# Patient Record
Sex: FEMALE | URBAN - NONMETROPOLITAN AREA
[De-identification: names, ages, dates, MRNs, and addresses within clinical notes are randomized per-mention and may not be internally consistent; named-entity substitution may affect disease eponyms.]

---

## 2022-03-14 ENCOUNTER — DOCTOR'S OFFICE (OUTPATIENT)
Dept: URBAN - NONMETROPOLITAN AREA CLINIC 1 | Facility: CLINIC | Age: 32
Setting detail: OPHTHALMOLOGY
End: 2022-03-14
Payer: MEDICARE

## 2022-03-14 DIAGNOSIS — H35.52: ICD-10-CM

## 2022-03-14 DIAGNOSIS — H25.13: ICD-10-CM

## 2022-03-14 PROCEDURE — 99204 OFFICE O/P NEW MOD 45 MIN: CPT | Performed by: OPHTHALMOLOGY

## 2022-03-14 PROCEDURE — 92134 CPTRZ OPH DX IMG PST SGM RTA: CPT | Performed by: OPHTHALMOLOGY

## 2022-03-14 ASSESSMENT — CONFRONTATIONAL VISUAL FIELD TEST (CVF)
OD_FINDINGS: FULL
OS_FINDINGS: FULL

## 2022-03-22 ASSESSMENT — REFRACTION_CURRENTRX
OD_AXIS: 168
OS_AXIS: 004
OD_CYLINDER: -1.75
OD_SPHERE: +1.00
OD_OVR_VA: 20/
OS_CYLINDER: -1.00
OS_SPHERE: +1.25
OS_OVR_VA: 20/

## 2022-03-22 ASSESSMENT — REFRACTION_AUTOREFRACTION
OS_CYLINDER: -0.75
OD_AXIS: 164
OS_SPHERE: +0.75
OD_SPHERE: +1.00
OD_CYLINDER: -1.50
OS_AXIS: 040

## 2022-03-22 ASSESSMENT — VISUAL ACUITY
OS_BCVA: 20/50-2
OD_BCVA: 20/80

## 2022-03-22 ASSESSMENT — SPHEQUIV_DERIVED
OD_SPHEQUIV: 0.25
OS_SPHEQUIV: 0.375

## 2022-04-05 ENCOUNTER — DOCTOR'S OFFICE (OUTPATIENT)
Dept: URBAN - NONMETROPOLITAN AREA CLINIC 1 | Facility: CLINIC | Age: 32
Setting detail: OPHTHALMOLOGY
End: 2022-04-05
Payer: MEDICARE

## 2022-04-05 ENCOUNTER — OPTICAL OFFICE (OUTPATIENT)
Dept: URBAN - NONMETROPOLITAN AREA CLINIC 4 | Facility: CLINIC | Age: 32
Setting detail: OPHTHALMOLOGY
End: 2022-04-05
Payer: COMMERCIAL

## 2022-04-05 ENCOUNTER — DOCTOR'S OFFICE (OUTPATIENT)
Dept: URBAN - NONMETROPOLITAN AREA CLINIC 1 | Facility: CLINIC | Age: 32
Setting detail: OPHTHALMOLOGY
End: 2022-04-05
Payer: COMMERCIAL

## 2022-04-05 DIAGNOSIS — H52.223: ICD-10-CM

## 2022-04-05 DIAGNOSIS — H52.03: ICD-10-CM

## 2022-04-05 DIAGNOSIS — Z02.4: ICD-10-CM

## 2022-04-05 PROCEDURE — 92012 INTRM OPH EXAM EST PATIENT: CPT | Performed by: OPTOMETRIST

## 2022-04-05 PROCEDURE — 92015 DETERMINE REFRACTIVE STATE: CPT | Performed by: OPTOMETRIST

## 2022-04-05 PROCEDURE — V2744 TINT PHOTOCHROMATIC LENS/ES: HCPCS | Performed by: OPTOMETRIST

## 2022-04-05 PROCEDURE — V2750 ANTI-REFLECTIVE COATING: HCPCS | Performed by: OPTOMETRIST

## 2022-04-05 PROCEDURE — V2020 VISION SVCS FRAMES PURCHASES: HCPCS | Performed by: OPTOMETRIST

## 2022-04-05 PROCEDURE — 67911 REVISE EYELID DEFECT: CPT | Performed by: OPTOMETRIST

## 2022-04-05 PROCEDURE — V2103 SPHEROCYLINDR 4.00D/12-2.00D: HCPCS | Performed by: OPTOMETRIST

## 2022-04-05 PROCEDURE — 92083 EXTENDED VISUAL FIELD XM: CPT | Performed by: OPTOMETRIST

## 2022-04-05 ASSESSMENT — REFRACTION_MANIFEST
OD_AXIS: 165
OD_SPHERE: +1.00
OS_CYLINDER: -1.00
OD_CYLINDER: -1.50
OS_SPHERE: +0.75
OD_VA1: 20/30+2
OS_AXIS: 040
OS_VA1: 20/100+1

## 2022-04-05 ASSESSMENT — REFRACTION_CURRENTRX
OD_SPHERE: +1.00
OD_OVR_VA: 20/
OS_AXIS: 005
OS_CYLINDER: -1.25
OS_OVR_VA: 20/
OS_SPHERE: +1.25
OD_CYLINDER: -1.75
OD_AXIS: 172

## 2022-04-05 ASSESSMENT — SPHEQUIV_DERIVED
OS_SPHEQUIV: 0.25
OD_SPHEQUIV: 0.25
OS_SPHEQUIV: 0.375
OD_SPHEQUIV: 0.25

## 2022-04-05 ASSESSMENT — VISUAL ACUITY
OD_BCVA: 20/100
OS_BCVA: 20/30+2

## 2022-04-05 ASSESSMENT — REFRACTION_AUTOREFRACTION
OS_SPHERE: +0.75
OS_CYLINDER: -0.75
OD_CYLINDER: -1.50
OS_AXIS: 040
OD_SPHERE: +1.00
OD_AXIS: 164

## 2022-04-05 ASSESSMENT — CONFRONTATIONAL VISUAL FIELD TEST (CVF)
OS_FINDINGS: FULL
OD_FINDINGS: FULL

## 2022-04-18 ENCOUNTER — DOCTOR'S OFFICE (OUTPATIENT)
Dept: URBAN - NONMETROPOLITAN AREA CLINIC 1 | Facility: CLINIC | Age: 32
Setting detail: OPHTHALMOLOGY
End: 2022-04-18

## 2022-04-18 DIAGNOSIS — H52.223: ICD-10-CM

## 2022-04-18 PROBLEM — H25.13 CATARACT NUCLEAR SCLEROSIS; BOTH EYES: Status: ACTIVE | Noted: 2022-03-14

## 2022-04-18 PROBLEM — H35.52 RETINITIS PIGMENTOSA ; BOTH EYES: Status: ACTIVE | Noted: 2022-03-14

## 2022-04-18 PROBLEM — Z02.4 ENCOUNTER FOR EXAMINATION FOR DRIVING LICENSE ; BOTH EYES: Status: ACTIVE | Noted: 2022-04-05

## 2022-04-18 PROCEDURE — CLFUP CONTACT LENS FOLLOW-UP: Performed by: OPTOMETRIST

## 2022-11-09 ENCOUNTER — DOCTOR'S OFFICE (OUTPATIENT)
Dept: URBAN - NONMETROPOLITAN AREA CLINIC 1 | Facility: CLINIC | Age: 32
Setting detail: OPHTHALMOLOGY
End: 2022-11-09
Payer: OTHER MISCELLANEOUS

## 2022-11-09 DIAGNOSIS — Z02.71: ICD-10-CM

## 2022-11-09 PROCEDURE — MEDICAL RE MEDICAL RECORDS: Performed by: OPHTHALMOLOGY

## 2023-04-11 ENCOUNTER — DOCTOR'S OFFICE (OUTPATIENT)
Dept: URBAN - NONMETROPOLITAN AREA CLINIC 1 | Facility: CLINIC | Age: 33
Setting detail: OPHTHALMOLOGY
End: 2023-04-11
Payer: MEDICARE

## 2023-04-11 ENCOUNTER — OPTICAL OFFICE (OUTPATIENT)
Dept: URBAN - NONMETROPOLITAN AREA CLINIC 4 | Facility: CLINIC | Age: 33
Setting detail: OPHTHALMOLOGY
End: 2023-04-11
Payer: COMMERCIAL

## 2023-04-11 DIAGNOSIS — H52.03: ICD-10-CM

## 2023-04-11 DIAGNOSIS — H52.223: ICD-10-CM

## 2023-04-11 DIAGNOSIS — H35.52: ICD-10-CM

## 2023-04-11 PROCEDURE — 92015 DETERMINE REFRACTIVE STATE: CPT | Performed by: OPTOMETRIST

## 2023-04-11 PROCEDURE — 92250 FUNDUS PHOTOGRAPHY W/I&R: CPT | Performed by: OPTOMETRIST

## 2023-04-11 PROCEDURE — 92014 COMPRE OPH EXAM EST PT 1/>: CPT | Performed by: OPTOMETRIST

## 2023-04-11 PROCEDURE — 92083 EXTENDED VISUAL FIELD XM: CPT | Performed by: OPTOMETRIST

## 2023-04-11 PROCEDURE — S0500 DISPOS CONT LENS: HCPCS | Performed by: OPTOMETRIST

## 2023-04-11 ASSESSMENT — TONOMETRY
OD_IOP_MMHG: 15
OS_IOP_MMHG: 15

## 2023-04-11 ASSESSMENT — CONFRONTATIONAL VISUAL FIELD TEST (CVF)
OS_FINDINGS: FULL
OD_FINDINGS: FULL

## 2023-04-12 ASSESSMENT — REFRACTION_CURRENTRX
OD_OVR_VA: 20/
OD_AXIS: 172
OD_CYLINDER: -1.75
OS_CYLINDER: -1.25
OS_OVR_VA: 20/
OD_SPHERE: +1.00
OS_SPHERE: +1.25
OS_AXIS: 005

## 2023-04-12 ASSESSMENT — SPHEQUIV_DERIVED
OD_SPHEQUIV: 1
OS_SPHEQUIV: 1.125
OS_SPHEQUIV: 1.375
OD_SPHEQUIV: 0.625

## 2023-04-12 ASSESSMENT — VISUAL ACUITY
OS_BCVA: 20/25-1
OD_BCVA: 20/80-1

## 2023-04-12 ASSESSMENT — REFRACTION_MANIFEST
OD_CYLINDER: -1.75
OS_CYLINDER: -0.75
OS_VA1: 20/100+1
OS_SPHERE: +1.50
OD_SPHERE: +1.50
OS_VA2: 20/100+1
OD_AXIS: 165
OD_VA1: 20/30+2
OS_AXIS: 065
OD_VA2: 20/30+2

## 2023-04-12 ASSESSMENT — REFRACTION_AUTOREFRACTION
OD_SPHERE: +2.00
OS_AXIS: 065
OS_CYLINDER: -0.75
OD_AXIS: 171
OS_SPHERE: +1.75
OD_CYLINDER: -2.00

## 2023-07-13 ENCOUNTER — DOCTOR'S OFFICE (OUTPATIENT)
Dept: URBAN - NONMETROPOLITAN AREA CLINIC 1 | Facility: CLINIC | Age: 33
Setting detail: OPHTHALMOLOGY
End: 2023-07-13
Payer: MEDICARE

## 2023-07-13 DIAGNOSIS — H35.52: ICD-10-CM

## 2023-07-13 DIAGNOSIS — H25.13: ICD-10-CM

## 2023-07-13 PROCEDURE — 92134 CPTRZ OPH DX IMG PST SGM RTA: CPT | Performed by: OPHTHALMOLOGY

## 2023-07-13 PROCEDURE — 99213 OFFICE O/P EST LOW 20 MIN: CPT | Performed by: OPHTHALMOLOGY

## 2023-07-13 ASSESSMENT — REFRACTION_CURRENTRX
OS_CYLINDER: -1.25
OS_AXIS: 005
OS_OVR_VA: 20/
OD_AXIS: 172
OD_OVR_VA: 20/
OS_SPHERE: +1.25
OD_SPHERE: +1.00
OD_CYLINDER: -1.75

## 2023-07-13 ASSESSMENT — VISUAL ACUITY
OS_BCVA: 20/25-2
OD_BCVA: 20/80-1

## 2023-07-13 ASSESSMENT — REFRACTION_MANIFEST
OD_VA1: 20/30+2
OS_VA1: 20/100+1
OD_SPHERE: +1.50
OS_VA2: 20/100+1
OS_SPHERE: +1.50
OS_AXIS: 065
OS_CYLINDER: -0.75
OD_VA2: 20/30+2
OD_CYLINDER: -1.75
OD_AXIS: 165

## 2023-07-13 ASSESSMENT — CONFRONTATIONAL VISUAL FIELD TEST (CVF)
OD_FINDINGS: FULL
OS_FINDINGS: FULL

## 2023-07-13 ASSESSMENT — REFRACTION_AUTOREFRACTION
OS_CYLINDER: -0.75
OD_SPHERE: +2.00
OD_AXIS: 171
OD_CYLINDER: -2.00
OS_AXIS: 065
OS_SPHERE: +1.75

## 2023-07-13 ASSESSMENT — SPHEQUIV_DERIVED
OS_SPHEQUIV: 1.375
OD_SPHEQUIV: 0.625
OS_SPHEQUIV: 1.125
OD_SPHEQUIV: 1

## 2023-07-27 ENCOUNTER — DOCTOR'S OFFICE (OUTPATIENT)
Dept: URBAN - NONMETROPOLITAN AREA CLINIC 1 | Facility: CLINIC | Age: 33
Setting detail: OPHTHALMOLOGY
End: 2023-07-27
Payer: MEDICARE

## 2023-07-27 DIAGNOSIS — H35.342: ICD-10-CM

## 2023-07-27 DIAGNOSIS — H25.13: ICD-10-CM

## 2023-07-27 DIAGNOSIS — H35.52: ICD-10-CM

## 2023-07-27 PROCEDURE — 92235 FLUORESCEIN ANGRPH MLTIFRAME: CPT | Performed by: OPHTHALMOLOGY

## 2023-07-27 PROCEDURE — 99213 OFFICE O/P EST LOW 20 MIN: CPT | Performed by: OPHTHALMOLOGY

## 2023-07-27 PROCEDURE — 92250 FUNDUS PHOTOGRAPHY W/I&R: CPT | Performed by: OPHTHALMOLOGY

## 2023-07-27 ASSESSMENT — REFRACTION_CURRENTRX
OS_OVR_VA: 20/
OD_OVR_VA: 20/
OS_AXIS: 005
OD_SPHERE: +1.00
OS_CYLINDER: -1.25
OS_SPHERE: +1.25
OD_CYLINDER: -1.75
OD_AXIS: 172

## 2023-07-27 ASSESSMENT — REFRACTION_AUTOREFRACTION
OD_CYLINDER: -2.00
OD_SPHERE: +2.00
OS_SPHERE: +1.75
OD_AXIS: 171
OS_AXIS: 065
OS_CYLINDER: -0.75

## 2023-07-27 ASSESSMENT — REFRACTION_MANIFEST
OS_CYLINDER: -0.75
OD_VA1: 20/30+2
OS_SPHERE: +1.50
OD_SPHERE: +1.50
OD_CYLINDER: -1.75
OS_VA1: 20/100+1
OS_AXIS: 065
OD_AXIS: 165
OS_VA2: 20/100+1
OD_VA2: 20/30+2

## 2023-07-27 ASSESSMENT — SPHEQUIV_DERIVED
OS_SPHEQUIV: 1.125
OD_SPHEQUIV: 1
OS_SPHEQUIV: 1.375
OD_SPHEQUIV: 0.625

## 2023-07-27 ASSESSMENT — CONFRONTATIONAL VISUAL FIELD TEST (CVF)
OS_FINDINGS: FULL
OD_FINDINGS: FULL

## 2023-07-27 ASSESSMENT — VISUAL ACUITY
OD_BCVA: 20/80-1
OS_BCVA: 20/25

## 2024-02-05 ENCOUNTER — RX ONLY (RX ONLY)
Age: 34
End: 2024-02-05

## 2024-02-05 ENCOUNTER — DOCTOR'S OFFICE (OUTPATIENT)
Dept: URBAN - NONMETROPOLITAN AREA CLINIC 1 | Facility: CLINIC | Age: 34
Setting detail: OPHTHALMOLOGY
End: 2024-02-05
Payer: COMMERCIAL

## 2024-02-05 DIAGNOSIS — H52.223: ICD-10-CM

## 2024-02-05 DIAGNOSIS — H52.03: ICD-10-CM

## 2024-02-05 PROCEDURE — 92012 INTRM OPH EXAM EST PATIENT: CPT | Performed by: OPTOMETRIST

## 2024-02-05 ASSESSMENT — CONFRONTATIONAL VISUAL FIELD TEST (CVF)
OS_FINDINGS: FULL
OD_FINDINGS: FULL

## 2024-02-12 ENCOUNTER — DOCTOR'S OFFICE (OUTPATIENT)
Dept: URBAN - NONMETROPOLITAN AREA CLINIC 1 | Facility: CLINIC | Age: 34
Setting detail: OPHTHALMOLOGY
End: 2024-02-12
Payer: MEDICARE

## 2024-02-12 DIAGNOSIS — Z02.4: ICD-10-CM

## 2024-02-12 PROCEDURE — 92082 INTERMEDIATE VISUAL FIELD XM: CPT | Performed by: OPHTHALMOLOGY

## 2024-02-14 ENCOUNTER — HOSPITAL ENCOUNTER (EMERGENCY)
Facility: HOSPITAL | Age: 34
Discharge: HOME | End: 2024-02-14
Attending: EMERGENCY MEDICINE
Payer: MEDICARE

## 2024-02-14 VITALS
DIASTOLIC BLOOD PRESSURE: 90 MMHG | BODY MASS INDEX: 30.9 KG/M2 | HEIGHT: 64 IN | RESPIRATION RATE: 18 BRPM | OXYGEN SATURATION: 99 % | SYSTOLIC BLOOD PRESSURE: 140 MMHG | WEIGHT: 181 LBS | TEMPERATURE: 97.7 F | HEART RATE: 80 BPM

## 2024-02-14 DIAGNOSIS — J02.0 STREP PHARYNGITIS: ICD-10-CM

## 2024-02-14 DIAGNOSIS — G43.909 MIGRAINE: Primary | ICD-10-CM

## 2024-02-14 LAB
FLUAV RNA RESP QL NAA+PROBE: NEGATIVE
FLUBV RNA RESP QL NAA+PROBE: NEGATIVE
RSV RNA RESP QL NAA+PROBE: NEGATIVE
S PYO DNA THROAT QL NAA+PROBE: DETECTED
SARS-COV-2 RNA RESP QL NAA+PROBE: NEGATIVE

## 2024-02-14 PROCEDURE — 87651 STREP A DNA AMP PROBE: CPT | Performed by: EMERGENCY MEDICINE

## 2024-02-14 PROCEDURE — 96375 TX/PRO/DX INJ NEW DRUG ADDON: CPT

## 2024-02-14 PROCEDURE — 96361 HYDRATE IV INFUSION ADD-ON: CPT

## 2024-02-14 PROCEDURE — 0241U HB NFCT DS VIR RESP RNA 4 TRGT: CPT | Performed by: EMERGENCY MEDICINE

## 2024-02-14 PROCEDURE — 96374 THER/PROPH/DIAG INJ IV PUSH: CPT

## 2024-02-14 PROCEDURE — 99284 EMERGENCY DEPT VISIT MOD MDM: CPT | Performed by: EMERGENCY MEDICINE

## 2024-02-14 PROCEDURE — 99283 EMERGENCY DEPT VISIT LOW MDM: CPT

## 2024-02-14 RX ORDER — KETOROLAC TROMETHAMINE 30 MG/ML
30 INJECTION, SOLUTION INTRAMUSCULAR; INTRAVENOUS ONCE
Status: COMPLETED | OUTPATIENT
Start: 2024-02-14 | End: 2024-02-14

## 2024-02-14 RX ORDER — DIPHENHYDRAMINE HYDROCHLORIDE 50 MG/ML
25 INJECTION INTRAMUSCULAR; INTRAVENOUS ONCE
Status: COMPLETED | OUTPATIENT
Start: 2024-02-14 | End: 2024-02-14

## 2024-02-14 RX ORDER — CEFADROXIL 500 MG/1
500 CAPSULE ORAL EVERY 12 HOURS SCHEDULED
Qty: 20 CAPSULE | Refills: 0 | Status: SHIPPED | OUTPATIENT
Start: 2024-02-14 | End: 2024-02-24

## 2024-02-14 RX ORDER — ONDANSETRON 4 MG/1
4 TABLET, FILM COATED ORAL EVERY 6 HOURS PRN
Qty: 10 TABLET | Refills: 0 | Status: SHIPPED | OUTPATIENT
Start: 2024-02-14

## 2024-02-14 RX ORDER — DROPERIDOL 2.5 MG/ML
2.5 INJECTION, SOLUTION INTRAMUSCULAR; INTRAVENOUS ONCE
Status: COMPLETED | OUTPATIENT
Start: 2024-02-14 | End: 2024-02-14

## 2024-02-14 RX ADMIN — KETOROLAC TROMETHAMINE 30 MG: 30 INJECTION, SOLUTION INTRAMUSCULAR; INTRAVENOUS at 19:46

## 2024-02-14 RX ADMIN — DROPERIDOL 2.5 MG: 2.5 INJECTION, SOLUTION INTRAMUSCULAR; INTRAVENOUS at 19:47

## 2024-02-14 RX ADMIN — SODIUM CHLORIDE 1000 ML: 0.9 INJECTION, SOLUTION INTRAVENOUS at 19:44

## 2024-02-14 RX ADMIN — DIPHENHYDRAMINE HYDROCHLORIDE 25 MG: 50 INJECTION, SOLUTION INTRAMUSCULAR; INTRAVENOUS at 19:48

## 2024-02-14 NOTE — Clinical Note
Stephen Bowling was seen and treated in our emergency department on 2/14/2024.                Diagnosis:     Stephen  may return to work on return date.    She may return on this date: 02/17/2024         If you have any questions or concerns, please don't hesitate to call.      Christian Crowe, DO    ______________________________           _______________          _______________  Hospital Representative                              Date                                Time

## 2024-02-15 NOTE — ED PROVIDER NOTES
History  Chief Complaint   Patient presents with    Headache     Pt reports since Sunday she's had a migraine. PMHx of migraines, does not feel the same feels worse. Prescribed medication not providing relief. Pt also c/o congestion, throat and ear pain.     33-year-old female accompanied by spouse describes migrainous pounding bilateral frontal headache over the past few days with sore throat, nasal congestion, headache nonamenable to usual triptan type oral medications      History provided by:  Patient  Headache  Pain location:  Frontal  Onset quality:  Gradual  Timing:  Constant  Progression:  Waxing and waning  Chronicity:  Recurrent  Similar to prior headaches: yes    Relieved by:  Prescription medications  Associated symptoms: URI    Associated symptoms: no abdominal pain, no back pain and no fever        Prior to Admission Medications   Prescriptions Last Dose Informant Patient Reported? Taking?   HYDROcodone-acetaminophen (NORCO) 5-325 mg per tablet   No No   Sig: Take 1 tablet by mouth every 6 (six) hours as needed for pain Max Daily Amount: 4 tablets   amoxicillin (AMOXIL) 500 mg capsule   No No   Sig: Take 1 capsule by mouth 3 (three) times a day   neomycin-polymyxin-hydrocortisone (CORTISPORIN) 0.35%-10,000 units/mL-1% otic suspension   No No   Sig: Administer 4 drops into ears 3 (three) times a day   sertraline (ZOLOFT) 50 mg tablet  Self Yes No   Sig: Take 50 mg by mouth daily.      Facility-Administered Medications: None       Past Medical History:   Diagnosis Date    Asthma     Migraine     Psychiatric disorder     anxiety and depression    Retinal pigmentation        History reviewed. No pertinent surgical history.    Family History   Problem Relation Age of Onset    Hypertension Mother      I have reviewed and agree with the history as documented.    E-Cigarette/Vaping    E-Cigarette Use Never User      E-Cigarette/Vaping Substances     Social History     Tobacco Use    Smoking status: Every Day      Current packs/day: 0.20     Types: Cigarettes   Vaping Use    Vaping status: Never Used   Substance Use Topics    Alcohol use: No    Drug use: No       Review of Systems   Constitutional:  Negative for fever.   Gastrointestinal:  Negative for abdominal pain.   Musculoskeletal:  Negative for back pain.   Neurological:  Positive for headaches.   All other systems reviewed and are negative.      Physical Exam  Physical Exam  Vitals and nursing note reviewed.   Constitutional:       General: She is not in acute distress.     Comments: Pleasant, comfortable-appearing, conversational, articulate   HENT:      Head: Normocephalic and atraumatic.      Right Ear: Tympanic membrane normal.      Left Ear: Tympanic membrane normal.      Mouth/Throat:      Mouth: Mucous membranes are moist.      Pharynx: Oropharynx is clear. Posterior oropharyngeal erythema present.      Comments: Large tonsils bilaterally, not kissing or asymmetric, no oropharyngeal swelling or sublingual swelling or submandibular asymmetry  Eyes:      General:         Right eye: No discharge.         Left eye: No discharge.      Conjunctiva/sclera: Conjunctivae normal.      Pupils: Pupils are equal, round, and reactive to light.   Cardiovascular:      Rate and Rhythm: Normal rate and regular rhythm.      Heart sounds: Normal heart sounds.   Pulmonary:      Effort: Pulmonary effort is normal.      Breath sounds: Normal breath sounds.   Abdominal:      General: Bowel sounds are normal. There is no distension.      Palpations: Abdomen is soft.      Tenderness: There is no abdominal tenderness.   Musculoskeletal:         General: No deformity.      Cervical back: Neck supple. Tenderness present. No rigidity.   Lymphadenopathy:      Cervical: Cervical adenopathy present.   Skin:     General: Skin is warm and dry.      Findings: No rash.   Neurological:      General: No focal deficit present.      Mental Status: She is alert and oriented to person, place, and  time.      Cranial Nerves: No cranial nerve deficit.      Coordination: Coordination normal.   Psychiatric:         Behavior: Behavior normal.         Thought Content: Thought content normal.         Judgment: Judgment normal.         Vital Signs  ED Triage Vitals [02/14/24 1916]   Temperature Pulse Respirations Blood Pressure SpO2   97.7 °F (36.5 °C) 80 18 143/92 99 %      Temp Source Heart Rate Source Patient Position - Orthostatic VS BP Location FiO2 (%)   Oral Monitor Sitting Left arm --      Pain Score       10 - Worst Possible Pain           Vitals:    02/14/24 1916 02/14/24 2130   BP: 143/92 140/90   Pulse: 80    Patient Position - Orthostatic VS: Sitting Lying         Visual Acuity      ED Medications  Medications   sodium chloride 0.9 % bolus 1,000 mL (0 mL Intravenous Stopped 2/14/24 2044)   ketorolac (TORADOL) injection 30 mg (30 mg Intravenous Given 2/14/24 1946)   diphenhydrAMINE (BENADRYL) injection 25 mg (25 mg Intravenous Given 2/14/24 1948)   droperidol (INAPSINE) injection 2.5 mg (2.5 mg Intravenous Given 2/14/24 1947)       Diagnostic Studies  Results Reviewed       Procedure Component Value Units Date/Time    FLU/RSV/COVID - if FLU/RSV clinically relevant [[id number]]  (Normal) Collected: 02/14/24 1941    Lab Status: Final result Specimen: Nares from Nose Updated: 02/14/24 2034     SARS-CoV-2 Negative     INFLUENZA A PCR Negative     INFLUENZA B PCR Negative     RSV PCR Negative    Narrative:      FOR PEDIATRIC PATIENTS - copy/paste COVID Guidelines URL to browser: [email or web address]    SARS-CoV-2 assay is a Nucleic Acid Amplification assay intended for the  qualitative detection of nucleic acid from SARS-CoV-2 in nasopharyngeal  swabs. Results are for the presumptive identification of SARS-CoV-2 RNA.    Positive results are indicative of infection with SARS-CoV-2, the virus  causing COVID-19, but do not rule out bacterial infection or  co-infection  with other viruses. Laboratories within the [place] and its  territories are required to report all positive results to the appropriate  public health authorities. Negative results do not preclude SARS-CoV-2  infection and should not be used as the sole basis for treatment or other  patient management decisions. Negative results must be combined with  clinical observations, patient history, and epidemiological information.  This test has not been FDA cleared or approved.    This test has been authorized by FDA under an Emergency Use Authorization  (EUA). This test is only authorized for the duration of time the  declaration that circumstances exist justifying the authorization of the  emergency use of an in vitro diagnostic tests for detection of SARS-CoV-2  virus and/or diagnosis of COVID-19 infection under section 564(b)(1) of  the Act, 21 U.S.C. 360bbb-3(b)(1), unless the authorization is terminated  or revoked sooner. The test has been validated but independent review by FDA  and CLIA is pending.    Test performed using [employer] Gene[employer]pert: This RT-PCR assay targets N2,  a region unique to SARS-CoV-2. A conserved region in the E-gene was chosen  for pan-Sarbecovirus detection which includes SARS-CoV-2.    According to CMS-2020-01-R, this platform meets the definition of high-throughput technology.    Strep A PCR [[id number]]  (Abnormal) Collected: 02/14/24 1941    Lab Status: Final result Specimen: Throat Updated: 02/14/24 2020     STREP A PCR Detected                   No orders to display              Procedures  Procedures         ED Course  ED Course as of 02/14/24 2252   Wed Feb 14, 2024 2124 STREP A PCR(!): Detected   2126 Headache resolved, feels much better. Discussed results and agreeable with close outpatient f/u, and will return if worse or new symptoms                               SBIRT 22yo+      Flowsheet Row Most Recent Value   Initial Alcohol Screen: US AUDIT-C     1. How  often do you have a drink containing alcohol? 0 Filed at: 02/14/2024 1920   2. How many drinks containing alcohol do you have on a typical day you are drinking?  0 Filed at: 02/14/2024 1920   3b. FEMALE Any Age, or MALE 65+: How often do you have 4 or more drinks on one occassion? 0 Filed at: 02/14/2024 1920   Audit-C Score 0 Filed at: 02/14/2024 1920   KENDAL: How many times in the past year have you...    Used an illegal drug or used a prescription medication for non-medical reasons? Never Filed at: 02/14/2024 1920                      Medical Decision Making  Amount and/or Complexity of Data Reviewed  Labs: ordered. Decision-making details documented in ED Course.  ECG/medicine tests: ordered and independent interpretation performed. Decision-making details documented in ED Course.    Risk  Prescription drug management.             Disposition  Final diagnoses:   Migraine   Strep pharyngitis     Time reflects when diagnosis was documented in both MDM as applicable and the Disposition within this note       Time User Action Codes Description Comment    2/14/2024  9:25 PM Christian Crowe Add [G43.909] Migraine     2/14/2024  9:25 PM Christian Crowe Add [J02.0] Strep pharyngitis           ED Disposition       ED Disposition   Discharge    Condition   Stable    Date/Time   Wed Feb 14, 2024 2125    Comment   Stephen Bowling discharge to home/self care.                   Follow-up Information       Follow up With Specialties Details Why Contact Info    Pan Fontaine MD Family Medicine   [address] [place] [address]  [phone]              Discharge Medication List as of 2/14/2024  9:26 PM        START taking these medications    Details   cefadroxil (DURICEF) 500 mg capsule Take 1 capsule (500 mg total) by mouth every 12 (twelve) hours for 10 days, Starting Wed 2/14/2024, Until Sat 2/24/2024, Normal      ondansetron (ZOFRAN) 4 mg tablet Take 1 tablet (4 mg total) by mouth every 6 (six) hours as  needed for nausea or vomiting, Starting Wed 2/14/2024, Normal           CONTINUE these medications which have NOT CHANGED    Details   amoxicillin (AMOXIL) 500 mg capsule Take 1 capsule by mouth 3 (three) times a day, Starting 10/25/2016, Until Discontinued, Print      HYDROcodone-acetaminophen (NORCO) 5-325 mg per tablet Take 1 tablet by mouth every 6 (six) hours as needed for pain Max Daily Amount: 4 tablets, Starting 10/25/2016, Until Discontinued, Print      neomycin-polymyxin-hydrocortisone (CORTISPORIN) 0.35%-10,000 units/mL-1% otic suspension Administer 4 drops into ears 3 (three) times a day, Starting 10/25/2016, Until Discontinued, Print      sertraline (ZOLOFT) 50 mg tablet Take 50 mg by mouth daily., Until Discontinued, Historical Med             No discharge procedures on file.    PDMP Review       None            ED Provider  Electronically Signed by             Christian Crowe DO  02/14/24 6206

## 2024-02-15 NOTE — DISCHARGE INSTRUCTIONS
Return immediately if worse or any new symptoms  Tylenol 1000 mg every 6 hours as needed  and/or  Advil 400 mg every 6 hours as needed  May take both together

## 2024-08-20 ENCOUNTER — DOCTOR'S OFFICE (OUTPATIENT)
Dept: URBAN - NONMETROPOLITAN AREA CLINIC 1 | Facility: CLINIC | Age: 34
Setting detail: OPHTHALMOLOGY
End: 2024-08-20
Payer: COMMERCIAL

## 2024-08-20 DIAGNOSIS — H52.223: ICD-10-CM

## 2024-08-20 DIAGNOSIS — Z03.89: ICD-10-CM

## 2024-08-20 DIAGNOSIS — Z02.4: ICD-10-CM

## 2024-08-20 PROCEDURE — 92014 COMPRE OPH EXAM EST PT 1/>: CPT | Performed by: OPTOMETRIST

## 2024-08-20 PROCEDURE — 92083 EXTENDED VISUAL FIELD XM: CPT | Performed by: OPTOMETRIST

## 2024-08-20 ASSESSMENT — CONFRONTATIONAL VISUAL FIELD TEST (CVF)
OS_FINDINGS: FULL
OD_FINDINGS: FULL

## 2024-08-22 ENCOUNTER — DOCTOR'S OFFICE (OUTPATIENT)
Dept: URBAN - NONMETROPOLITAN AREA CLINIC 1 | Facility: CLINIC | Age: 34
Setting detail: OPHTHALMOLOGY
End: 2024-08-22
Payer: MEDICARE

## 2024-08-22 DIAGNOSIS — H35.342: ICD-10-CM

## 2024-08-22 DIAGNOSIS — H35.52: ICD-10-CM

## 2024-08-22 DIAGNOSIS — H25.13: ICD-10-CM

## 2024-08-22 PROCEDURE — 92014 COMPRE OPH EXAM EST PT 1/>: CPT | Performed by: OPHTHALMOLOGY

## 2024-08-22 PROCEDURE — 92134 CPTRZ OPH DX IMG PST SGM RTA: CPT | Performed by: OPHTHALMOLOGY

## 2024-08-22 ASSESSMENT — CONFRONTATIONAL VISUAL FIELD TEST (CVF)
OS_FINDINGS: FULL
OD_FINDINGS: FULL

## 2024-09-16 ENCOUNTER — DOCTOR'S OFFICE (OUTPATIENT)
Dept: URBAN - NONMETROPOLITAN AREA CLINIC 1 | Facility: CLINIC | Age: 34
Setting detail: OPHTHALMOLOGY
End: 2024-09-16
Payer: MEDICARE

## 2024-09-16 DIAGNOSIS — H35.52: ICD-10-CM

## 2024-09-16 DIAGNOSIS — H35.342: ICD-10-CM

## 2024-09-16 PROCEDURE — 92250 FUNDUS PHOTOGRAPHY W/I&R: CPT | Performed by: OPHTHALMOLOGY

## 2024-09-16 PROCEDURE — 92235 FLUORESCEIN ANGRPH MLTIFRAME: CPT | Performed by: OPHTHALMOLOGY

## 2024-09-16 PROCEDURE — 99214 OFFICE O/P EST MOD 30 MIN: CPT | Performed by: OPHTHALMOLOGY

## 2024-09-16 ASSESSMENT — REFRACTION_MANIFEST
OD_SPHERE: +1.25
OS_CYLINDER: -1.00
OS_VA2: 20/80-2
OD_VA1: 20/25+2
OS_AXIS: 035
OD_VA2: 20/25+2
OS_SPHERE: +1.00
OS_VA1: 20/80-2
OD_CYLINDER: -1.50
OD_AXIS: 170

## 2024-09-16 ASSESSMENT — REFRACTION_CURRENTRX
OS_CYLINDER: -1.00
OD_SPHERE: +1.25
OD_CYLINDER: -1.50
OD_VPRISM_DIRECTION: SV
OD_OVR_VA: 20/
OS_AXIS: 30
OD_AXIS: 175
OS_VPRISM_DIRECTION: SV
OS_SPHERE: +1.00
OS_OVR_VA: 20/

## 2024-09-16 ASSESSMENT — VISUAL ACUITY
OS_BCVA: 20/25
OD_BCVA: 20/80+1

## 2024-09-16 ASSESSMENT — CONFRONTATIONAL VISUAL FIELD TEST (CVF)
OS_FINDINGS: FULL
OD_FINDINGS: FULL

## 2024-09-16 ASSESSMENT — REFRACTION_AUTOREFRACTION
OS_SPHERE: +1.50
OD_SPHERE: +1.50
OS_CYLINDER: -1.25
OD_CYLINDER: -1.50
OS_AXIS: 16
OD_AXIS: 170

## 2024-10-14 ENCOUNTER — DOCTOR'S OFFICE (OUTPATIENT)
Dept: URBAN - NONMETROPOLITAN AREA CLINIC 1 | Facility: CLINIC | Age: 34
Setting detail: OPHTHALMOLOGY
End: 2024-10-14
Payer: MEDICARE

## 2024-10-14 ENCOUNTER — RX ONLY (RX ONLY)
Age: 34
End: 2024-10-14

## 2024-10-14 DIAGNOSIS — H35.52: ICD-10-CM

## 2024-10-14 DIAGNOSIS — H35.342: ICD-10-CM

## 2024-10-14 PROCEDURE — 92134 CPTRZ OPH DX IMG PST SGM RTA: CPT | Performed by: OPHTHALMOLOGY

## 2024-10-14 PROCEDURE — 99213 OFFICE O/P EST LOW 20 MIN: CPT | Performed by: OPHTHALMOLOGY

## 2024-10-14 RX ORDER — DORZOLAMIDE HYDROCHLORIDE 20 MG/ML
SOLUTION/ DROPS OPHTHALMIC
Qty: 5 | Refills: 4 | Status: ACTIVE | OUTPATIENT

## 2024-10-14 ASSESSMENT — REFRACTION_CURRENTRX
OD_CYLINDER: -1.50
OS_AXIS: 30
OS_OVR_VA: 20/
OS_SPHERE: +1.00
OD_OVR_VA: 20/
OS_VPRISM_DIRECTION: SV
OS_CYLINDER: -1.00
OD_AXIS: 175
OD_SPHERE: +1.25
OD_VPRISM_DIRECTION: SV

## 2024-10-14 ASSESSMENT — REFRACTION_AUTOREFRACTION
OS_CYLINDER: -1.25
OD_CYLINDER: -1.50
OD_AXIS: 170
OD_SPHERE: +1.50
OS_AXIS: 16
OS_SPHERE: +1.50

## 2024-10-14 ASSESSMENT — REFRACTION_MANIFEST
OS_CYLINDER: -1.00
OD_VA2: 20/25+2
OD_CYLINDER: -1.50
OS_VA2: 20/80-2
OD_VA1: 20/25+2
OD_SPHERE: +1.25
OS_AXIS: 035
OS_SPHERE: +1.00
OS_VA1: 20/80-2
OD_AXIS: 170

## 2024-10-14 ASSESSMENT — CONFRONTATIONAL VISUAL FIELD TEST (CVF)
OS_FINDINGS: FULL
OD_FINDINGS: FULL

## 2024-10-14 ASSESSMENT — VISUAL ACUITY
OD_BCVA: 20/60-1
OS_BCVA: 20/25

## 2024-10-18 ENCOUNTER — OPTICAL OFFICE (OUTPATIENT)
Dept: URBAN - NONMETROPOLITAN AREA CLINIC 4 | Facility: CLINIC | Age: 34
Setting detail: OPHTHALMOLOGY
End: 2024-10-18
Payer: COMMERCIAL

## 2024-10-18 DIAGNOSIS — H52.223: ICD-10-CM

## 2024-10-18 PROCEDURE — V2744 TINT PHOTOCHROMATIC LENS/ES: HCPCS | Mod: LT | Performed by: OPTOMETRIST

## 2024-10-18 PROCEDURE — V2750 ANTI-REFLECTIVE COATING: HCPCS | Mod: LT | Performed by: OPTOMETRIST

## 2024-10-18 PROCEDURE — V2103 SPHEROCYLINDR 4.00D/12-2.00D: HCPCS | Mod: LT | Performed by: OPTOMETRIST

## 2024-10-18 PROCEDURE — V2020 VISION SVCS FRAMES PURCHASES: HCPCS | Performed by: OPTOMETRIST

## 2024-10-18 PROCEDURE — V2750 ANTI-REFLECTIVE COATING: HCPCS | Performed by: OPTOMETRIST

## 2024-10-18 PROCEDURE — V2744 TINT PHOTOCHROMATIC LENS/ES: HCPCS | Performed by: OPTOMETRIST

## 2024-10-18 PROCEDURE — V2103 SPHEROCYLINDR 4.00D/12-2.00D: HCPCS | Performed by: OPTOMETRIST

## 2025-01-11 ENCOUNTER — RX ONLY (RX ONLY)
Age: 35
End: 2025-01-11

## 2025-01-11 RX ORDER — BRIMONIDINE TARTRATE 1 MG/ML
SOLUTION/ DROPS OPHTHALMIC
Qty: 5 | Refills: 3 | Status: ACTIVE | OUTPATIENT

## 2025-01-23 ENCOUNTER — DOCTOR'S OFFICE (OUTPATIENT)
Dept: URBAN - NONMETROPOLITAN AREA CLINIC 1 | Facility: CLINIC | Age: 35
Setting detail: OPHTHALMOLOGY
End: 2025-01-23
Payer: MEDICARE

## 2025-01-23 DIAGNOSIS — H25.13: ICD-10-CM

## 2025-01-23 DIAGNOSIS — H40.053: ICD-10-CM

## 2025-01-23 DIAGNOSIS — H35.52: ICD-10-CM

## 2025-01-23 DIAGNOSIS — H35.342: ICD-10-CM

## 2025-01-23 PROCEDURE — 92134 CPTRZ OPH DX IMG PST SGM RTA: CPT | Performed by: OPHTHALMOLOGY

## 2025-01-23 PROCEDURE — 92202 OPSCPY EXTND ON/MAC DRAW: CPT | Performed by: OPHTHALMOLOGY

## 2025-01-23 PROCEDURE — 99214 OFFICE O/P EST MOD 30 MIN: CPT | Performed by: OPHTHALMOLOGY

## 2025-01-23 ASSESSMENT — REFRACTION_CURRENTRX
OD_VPRISM_DIRECTION: SV
OS_AXIS: 30
OS_CYLINDER: -1.00
OS_VPRISM_DIRECTION: SV
OD_AXIS: 175
OS_OVR_VA: 20/
OD_SPHERE: +1.25
OD_CYLINDER: -1.50
OS_SPHERE: +1.00
OD_OVR_VA: 20/

## 2025-01-23 ASSESSMENT — REFRACTION_AUTOREFRACTION
OD_SPHERE: +1.50
OS_CYLINDER: -1.25
OS_SPHERE: +1.50
OD_CYLINDER: -1.50
OS_AXIS: 16
OD_AXIS: 170

## 2025-01-23 ASSESSMENT — REFRACTION_MANIFEST
OD_CYLINDER: -1.50
OS_VA1: 20/80-2
OD_AXIS: 170
OD_VA2: 20/25+2
OD_SPHERE: +1.25
OS_AXIS: 035
OS_CYLINDER: -1.00
OD_VA1: 20/25+2
OS_VA2: 20/80-2
OS_SPHERE: +1.00

## 2025-01-23 ASSESSMENT — VISUAL ACUITY
OD_BCVA: 20/80-1
OS_BCVA: 20/40+2

## 2025-01-23 ASSESSMENT — CONFRONTATIONAL VISUAL FIELD TEST (CVF)
OD_FINDINGS: FULL
OS_FINDINGS: FULL

## 2025-02-20 ENCOUNTER — DOCTOR'S OFFICE (OUTPATIENT)
Dept: URBAN - NONMETROPOLITAN AREA CLINIC 1 | Facility: CLINIC | Age: 35
Setting detail: OPHTHALMOLOGY
End: 2025-02-20
Payer: MEDICARE

## 2025-02-20 ENCOUNTER — RX ONLY (RX ONLY)
Age: 35
End: 2025-02-20

## 2025-02-20 DIAGNOSIS — H40.053: ICD-10-CM

## 2025-02-20 DIAGNOSIS — H35.342: ICD-10-CM

## 2025-02-20 DIAGNOSIS — H35.52: ICD-10-CM

## 2025-02-20 PROCEDURE — 92134 CPTRZ OPH DX IMG PST SGM RTA: CPT | Performed by: OPHTHALMOLOGY

## 2025-02-20 PROCEDURE — 99213 OFFICE O/P EST LOW 20 MIN: CPT | Performed by: OPHTHALMOLOGY

## 2025-02-20 ASSESSMENT — REFRACTION_AUTOREFRACTION
OS_CYLINDER: -1.25
OS_SPHERE: +1.50
OS_AXIS: 16
OD_SPHERE: +1.50
OD_CYLINDER: -1.50
OD_AXIS: 170

## 2025-02-20 ASSESSMENT — REFRACTION_CURRENTRX
OS_CYLINDER: -1.00
OS_AXIS: 30
OD_AXIS: 175
OS_OVR_VA: 20/
OS_SPHERE: +1.00
OD_VPRISM_DIRECTION: SV
OD_OVR_VA: 20/
OD_CYLINDER: -1.50
OD_SPHERE: +1.25
OS_VPRISM_DIRECTION: SV

## 2025-02-20 ASSESSMENT — CONFRONTATIONAL VISUAL FIELD TEST (CVF)
OD_FINDINGS: FULL
OS_FINDINGS: FULL

## 2025-02-20 ASSESSMENT — REFRACTION_MANIFEST
OD_SPHERE: +1.25
OS_AXIS: 035
OD_CYLINDER: -1.50
OS_VA2: 20/80-2
OS_CYLINDER: -1.00
OS_SPHERE: +1.00
OD_VA1: 20/25+2
OD_AXIS: 170
OD_VA2: 20/25+2
OS_VA1: 20/80-2

## 2025-02-20 ASSESSMENT — VISUAL ACUITY
OD_BCVA: 20/80
OS_BCVA: 20/30+2

## 2025-03-20 ENCOUNTER — DOCTOR'S OFFICE (OUTPATIENT)
Dept: URBAN - NONMETROPOLITAN AREA CLINIC 1 | Facility: CLINIC | Age: 35
Setting detail: OPHTHALMOLOGY
End: 2025-03-20
Payer: MEDICARE

## 2025-03-20 ENCOUNTER — RX ONLY (RX ONLY)
Age: 35
End: 2025-03-20

## 2025-03-20 DIAGNOSIS — H25.13: ICD-10-CM

## 2025-03-20 DIAGNOSIS — H40.053: ICD-10-CM

## 2025-03-20 DIAGNOSIS — H35.342: ICD-10-CM

## 2025-03-20 DIAGNOSIS — H35.52: ICD-10-CM

## 2025-03-20 PROCEDURE — 92134 CPTRZ OPH DX IMG PST SGM RTA: CPT | Performed by: OPHTHALMOLOGY

## 2025-03-20 PROCEDURE — 99214 OFFICE O/P EST MOD 30 MIN: CPT | Performed by: OPHTHALMOLOGY

## 2025-03-20 PROCEDURE — 92202 OPSCPY EXTND ON/MAC DRAW: CPT | Performed by: OPHTHALMOLOGY

## 2025-03-20 ASSESSMENT — REFRACTION_MANIFEST
OS_AXIS: 035
OS_CYLINDER: -1.00
OD_SPHERE: +1.25
OD_VA1: 20/25+2
OS_VA1: 20/80-2
OS_VA2: 20/80-2
OD_AXIS: 170
OD_CYLINDER: -1.50
OS_SPHERE: +1.00
OD_VA2: 20/25+2

## 2025-03-20 ASSESSMENT — REFRACTION_CURRENTRX
OD_SPHERE: +1.25
OS_AXIS: 30
OS_SPHERE: +1.00
OD_VPRISM_DIRECTION: SV
OD_AXIS: 175
OD_OVR_VA: 20/
OS_OVR_VA: 20/
OS_CYLINDER: -1.00
OS_VPRISM_DIRECTION: SV
OD_CYLINDER: -1.50

## 2025-03-20 ASSESSMENT — REFRACTION_AUTOREFRACTION
OD_SPHERE: +1.50
OS_SPHERE: +1.50
OS_AXIS: 16
OD_AXIS: 170
OS_CYLINDER: -1.25
OD_CYLINDER: -1.50

## 2025-03-20 ASSESSMENT — CONFRONTATIONAL VISUAL FIELD TEST (CVF)
OS_FINDINGS: FULL
OD_FINDINGS: FULL

## 2025-03-20 ASSESSMENT — VISUAL ACUITY
OD_BCVA: 20/100-1
OS_BCVA: 20/30-1